# Patient Record
Sex: MALE | Race: WHITE | NOT HISPANIC OR LATINO | Employment: OTHER | URBAN - METROPOLITAN AREA
[De-identification: names, ages, dates, MRNs, and addresses within clinical notes are randomized per-mention and may not be internally consistent; named-entity substitution may affect disease eponyms.]

---

## 2020-09-16 ENCOUNTER — OFFICE VISIT (OUTPATIENT)
Dept: NEUROLOGY | Facility: CLINIC | Age: 55
End: 2020-09-16
Payer: MEDICAID

## 2020-09-16 VITALS
DIASTOLIC BLOOD PRESSURE: 84 MMHG | SYSTOLIC BLOOD PRESSURE: 148 MMHG | RESPIRATION RATE: 16 BRPM | TEMPERATURE: 97 F | HEIGHT: 68 IN | WEIGHT: 212.94 LBS | BODY MASS INDEX: 32.27 KG/M2 | HEART RATE: 78 BPM

## 2020-09-16 DIAGNOSIS — G89.29 CHRONIC NECK PAIN: ICD-10-CM

## 2020-09-16 DIAGNOSIS — M54.2 CHRONIC NECK PAIN: ICD-10-CM

## 2020-09-16 DIAGNOSIS — M75.42 IMPINGEMENT SYNDROME OF BOTH SHOULDERS: ICD-10-CM

## 2020-09-16 DIAGNOSIS — M75.41 IMPINGEMENT SYNDROME OF BOTH SHOULDERS: ICD-10-CM

## 2020-09-16 PROCEDURE — 99204 OFFICE O/P NEW MOD 45 MIN: CPT | Mod: PBBFAC | Performed by: PHYSICIAN ASSISTANT

## 2020-09-16 PROCEDURE — 99999 PR PBB SHADOW E&M-NEW PATIENT-LVL IV: ICD-10-PCS | Mod: PBBFAC,,, | Performed by: PHYSICIAN ASSISTANT

## 2020-09-16 PROCEDURE — 99204 PR OFFICE/OUTPT VISIT, NEW, LEVL IV, 45-59 MIN: ICD-10-PCS | Mod: S$PBB,,, | Performed by: PHYSICIAN ASSISTANT

## 2020-09-16 PROCEDURE — 99999 PR PBB SHADOW E&M-NEW PATIENT-LVL IV: CPT | Mod: PBBFAC,,, | Performed by: PHYSICIAN ASSISTANT

## 2020-09-16 PROCEDURE — 99204 OFFICE O/P NEW MOD 45 MIN: CPT | Mod: S$PBB,,, | Performed by: PHYSICIAN ASSISTANT

## 2020-09-16 RX ORDER — POTASSIUM CHLORIDE 20 MEQ/1
20 TABLET, EXTENDED RELEASE ORAL DAILY
COMMUNITY
Start: 2020-09-01 | End: 2023-06-26

## 2020-09-16 RX ORDER — COLCHICINE 0.6 MG/1
1 CAPSULE ORAL DAILY
COMMUNITY
Start: 2020-09-01 | End: 2023-06-26

## 2020-09-16 RX ORDER — BISOPROLOL FUMARATE 5 MG/1
5 TABLET, FILM COATED ORAL DAILY
COMMUNITY
Start: 2020-09-01

## 2020-09-16 RX ORDER — TIZANIDINE 4 MG/1
4 TABLET ORAL 3 TIMES DAILY PRN
COMMUNITY
Start: 2020-08-21 | End: 2021-02-19

## 2020-09-16 RX ORDER — INDOMETHACIN 50 MG/1
50 CAPSULE ORAL 2 TIMES DAILY PRN
COMMUNITY
Start: 2020-09-01

## 2020-09-16 RX ORDER — NAPROXEN 500 MG/1
500 TABLET ORAL 2 TIMES DAILY PRN
COMMUNITY
Start: 2020-08-03 | End: 2021-02-19

## 2020-09-16 RX ORDER — MELOXICAM 7.5 MG/1
TABLET ORAL
COMMUNITY
Start: 2020-08-13 | End: 2021-02-19

## 2020-09-16 RX ORDER — GABAPENTIN 300 MG/1
CAPSULE ORAL
COMMUNITY
Start: 2020-09-02 | End: 2021-02-19

## 2020-09-16 RX ORDER — AMLODIPINE AND BENAZEPRIL HYDROCHLORIDE 5; 40 MG/1; MG/1
1 CAPSULE ORAL DAILY
COMMUNITY
Start: 2020-09-01 | End: 2023-06-26

## 2020-09-16 RX ORDER — METFORMIN HYDROCHLORIDE 500 MG/1
500 TABLET ORAL 2 TIMES DAILY
COMMUNITY
Start: 2020-09-01

## 2020-09-16 RX ORDER — SIMVASTATIN 40 MG/1
40 TABLET, FILM COATED ORAL DAILY
COMMUNITY
Start: 2020-09-01

## 2020-09-16 RX ORDER — SITAGLIPTIN 50 MG/1
50 TABLET, FILM COATED ORAL DAILY
COMMUNITY
Start: 2020-09-01

## 2020-09-16 NOTE — PROGRESS NOTES
Subjective:       Patient ID: Tal Salmeron is a 55 y.o. male.    Chief Complaint: Pain (bilateral shoulders/ into arms/ left side worse than right/ PT started this morning )    HPI  Tal Salmeron is a 55 y.o. male is here for initial visit. He is referred for bilateral arm pain and neck pain. Pain started a few months ago. No specific injury. He complains of crepitus in the neck with movement. He has pain with movement as well. Pain is in right and left neck and into traps. Both shoulders are painful, left worse than right. He has no numbness or tingling in the arms    He had cervical and shoulder x rays done a last month. Degenerative changes in the lower c spine were seen. Shoulder x rays not available.    He has no weakness. No bowel or bladder incontinence. He has some long standing ED issues.    First PT session this AM. More pain since. He plans to continue if approved.    Past Medical History:   Diagnosis Date    High blood pressure        History reviewed. No pertinent surgical history.    History reviewed. No pertinent family history.    Social History     Socioeconomic History    Marital status:      Spouse name: Not on file    Number of children: Not on file    Years of education: Not on file    Highest education level: Not on file   Occupational History    Not on file   Social Needs    Financial resource strain: Not on file    Food insecurity     Worry: Not on file     Inability: Not on file    Transportation needs     Medical: Not on file     Non-medical: Not on file   Tobacco Use    Smoking status: Current Every Day Smoker     Types: Pipe   Substance and Sexual Activity    Alcohol use: Not on file    Drug use: Not on file    Sexual activity: Not on file   Lifestyle    Physical activity     Days per week: Not on file     Minutes per session: Not on file    Stress: Not on file   Relationships    Social connections     Talks on phone: Not on file     Gets together: Not on file      Attends [employer] service: Not on file     Active member of club or organization: Not on file     Attends meetings of clubs or organizations: Not on file     Relationship status: Not on file   Other Topics Concern    Not on file   Social History Narrative    Not on file       Current Outpatient Medications   Medication Sig Dispense Refill    amLODIPine-benazepriL (LOTREL) 5-40 mg per capsule Take 1 capsule by mouth once daily.      bisoprolol (ZEBETA) 5 MG tablet Take 5 mg by mouth once daily.      colchicine (MITIGARE) 0.6 mg Cap Take 1 capsule by mouth once daily.      gabapentin (NEURONTIN) 300 MG capsule TAKE ONE CAPSULE BY MOUTH AT BEDTIME for nerve pain      indomethacin (INDOCIN) 50 MG capsule Take 50 mg by mouth 2 (two) times daily.      JANUVIA 50 mg Tab Take 50 mg by mouth once daily.      metFORMIN (GLUCOPHAGE) 500 MG tablet Take 500 mg by mouth 2 (two) times daily.      potassium chloride SA (K-DUR,KLOR-CON) 20 MEQ tablet Take 20 mEq by mouth once daily.      simvastatin (ZOCOR) 40 MG tablet Take 40 mg by mouth once daily.      tiZANidine (ZANAFLEX) 4 MG tablet Take 4 mg by mouth 3 (three) times daily as needed.      meloxicam (MOBIC) 7.5 MG tablet TAKE ONE TABLET BY MOUTH once a day WITH FOOD      naproxen (NAPROSYN) 500 MG tablet Take 500 mg by mouth 2 (two) times daily as needed. for pain       No current facility-administered medications for this visit.        Review of patient's allergies indicates:  No Known Allergies      Review of Systems   Constitutional: Positive for activity change (covid, layed off). Negative for appetite change and fever.   HENT: Negative for sore throat.    Eyes: Negative for visual disturbance.   Respiratory: Negative for cough and shortness of breath.    Cardiovascular: Negative for chest pain.   Gastrointestinal: Negative for nausea and vomiting.   Endocrine: Negative for cold intolerance and heat intolerance.   Genitourinary: Negative for difficulty  urinating.   Musculoskeletal: Positive for arthralgias (bilat shoulder pain), neck pain and neck stiffness. Negative for back pain.   Skin: Negative for rash.   Allergic/Immunologic: Negative for food allergies.   Neurological: Negative for dizziness, tremors, seizures, speech difficulty, weakness, numbness and headaches.   Hematological: Does not bruise/bleed easily.   Psychiatric/Behavioral: Negative for agitation, decreased concentration and sleep disturbance.       Objective:      Neurologic Exam     Mental Status   Oriented to person, place, and time.     Cranial Nerves     CN III, IV, VI   Pupils are equal, round, and reactive to light.    Gait, Coordination, and Reflexes     Gait  Gait: normal    Reflexes   Right brachioradialis: 2+  Left brachioradialis: 2+  Right biceps: 2+  Left biceps: 2+  Right triceps: 2+  Left triceps: 2+  Right patellar: 2+  Left patellar: 2+  Right achilles: 2+  Left achilles: 2+    Physical Exam  Vitals signs and nursing note reviewed.   Constitutional:       General: He is not in acute distress.     Appearance: Normal appearance. He is well-developed. He is not diaphoretic.   HENT:      Head: Normocephalic and atraumatic.      Right Ear: External ear normal.      Left Ear: External ear normal.      Nose: Nose normal.   Eyes:      General: No scleral icterus.        Right eye: No discharge.         Left eye: No discharge.      Extraocular Movements: Extraocular movements intact.      Conjunctiva/sclera: Conjunctivae normal.      Pupils: Pupils are equal, round, and reactive to light.   Neck:      Musculoskeletal: Neck supple. Muscular tenderness (cervical facet tenderness right and left throughout) present.      Comments: Cervical kyphosis  Limited rotation L and R  Limited extension  Cardiovascular:      Rate and Rhythm: Normal rate and regular rhythm.      Heart sounds: Normal heart sounds.   Pulmonary:      Effort: Pulmonary effort is normal.      Breath sounds: Normal breath  sounds.   Abdominal:      General: Bowel sounds are normal.      Palpations: Abdomen is soft.   Musculoskeletal:         General: No tenderness.      Comments: Limited abd both shoulers  Less than 90 degrees on Left  Pain with abd, pain w IR bilat   Skin:     General: Skin is warm and dry.   Neurological:      Mental Status: He is alert and oriented to person, place, and time.      Cranial Nerves: Cranial nerves are intact. No cranial nerve deficit.      Sensory: Sensation is intact.      Motor: Motor function is intact. No abnormal muscle tone.      Coordination: Coordination is intact. Coordination normal.      Gait: Gait is intact.      Deep Tendon Reflexes: Babinski sign absent on the right side. Babinski sign absent on the left side.      Reflex Scores:       Tricep reflexes are 2+ on the right side and 2+ on the left side.       Bicep reflexes are 2+ on the right side and 2+ on the left side.       Brachioradialis reflexes are 2+ on the right side and 2+ on the left side.       Patellar reflexes are 2+ on the right side and 2+ on the left side.       Achilles reflexes are 2+ on the right side and 2+ on the left side.     Comments: Negative Spurling's R/L  Rodas's negative.   Psychiatric:         Mood and Affect: Mood normal.         Behavior: Behavior normal.         Assessment:       1. Chronic neck pain    2. Impingement syndrome of both shoulders        Plan:   Chronic neck pain  Demonstrated exercises. Encouraged him to perform daily stretching exercises and ROM exercises.  Very limited ROM.  Continue PT as well.  No suspicion of cord involvement.    Impingement syndrome of both shoulders  No radicular symptoms suspected.  More shoulder pain than neck pain.  I will refer to ortho for further eval and treatment.      Discussed risks and benefits of potential treatment options at length as well as potential side effects of medication changes. Medications were changed. Please refer to medication  reconciliation report for details. Medication compliance discussed. Instructed to call clinic if concerned or to ED if emergency.    ROXANA Tabor

## 2020-09-22 DIAGNOSIS — M25.512 BILATERAL SHOULDER PAIN, UNSPECIFIED CHRONICITY: Primary | ICD-10-CM

## 2020-09-22 DIAGNOSIS — M25.511 BILATERAL SHOULDER PAIN, UNSPECIFIED CHRONICITY: Primary | ICD-10-CM

## 2020-09-23 ENCOUNTER — OFFICE VISIT (OUTPATIENT)
Dept: ORTHOPEDICS | Facility: CLINIC | Age: 55
End: 2020-09-23
Payer: MEDICAID

## 2020-09-23 ENCOUNTER — HOSPITAL ENCOUNTER (OUTPATIENT)
Dept: RADIOLOGY | Facility: HOSPITAL | Age: 55
Discharge: HOME | End: 2020-09-23
Attending: PHYSICIAN ASSISTANT
Payer: MEDICAID

## 2020-09-23 VITALS
TEMPERATURE: 98 F | RESPIRATION RATE: 18 BRPM | DIASTOLIC BLOOD PRESSURE: 88 MMHG | HEIGHT: 68 IN | BODY MASS INDEX: 32.23 KG/M2 | SYSTOLIC BLOOD PRESSURE: 146 MMHG | WEIGHT: 212.63 LBS | HEART RATE: 80 BPM

## 2020-09-23 DIAGNOSIS — M25.512 BILATERAL SHOULDER PAIN, UNSPECIFIED CHRONICITY: ICD-10-CM

## 2020-09-23 DIAGNOSIS — G89.29 CHRONIC PAIN OF BOTH SHOULDERS: Primary | ICD-10-CM

## 2020-09-23 DIAGNOSIS — M75.41 IMPINGEMENT SYNDROME OF BOTH SHOULDERS: ICD-10-CM

## 2020-09-23 DIAGNOSIS — M25.511 BILATERAL SHOULDER PAIN, UNSPECIFIED CHRONICITY: ICD-10-CM

## 2020-09-23 DIAGNOSIS — M25.512 CHRONIC PAIN OF BOTH SHOULDERS: Primary | ICD-10-CM

## 2020-09-23 DIAGNOSIS — M25.511 CHRONIC PAIN OF BOTH SHOULDERS: Primary | ICD-10-CM

## 2020-09-23 DIAGNOSIS — M75.42 IMPINGEMENT SYNDROME OF BOTH SHOULDERS: ICD-10-CM

## 2020-09-23 PROCEDURE — 73030 X-RAY EXAM OF SHOULDER: CPT | Mod: TC,50

## 2020-09-23 PROCEDURE — S0020 INJECTION, BUPIVICAINE HYDRO: HCPCS | Mod: PBBFAC | Performed by: PHYSICIAN ASSISTANT

## 2020-09-23 PROCEDURE — 73030 XR SHOULDER COMPLETE 2 OR MORE VIEWS BILATERAL: ICD-10-PCS | Mod: 26,50,, | Performed by: RADIOLOGY

## 2020-09-23 PROCEDURE — 73030 X-RAY EXAM OF SHOULDER: CPT | Mod: 26,50,, | Performed by: RADIOLOGY

## 2020-09-23 PROCEDURE — 99999 PR PBB SHADOW E&M-EST. PATIENT-LVL V: ICD-10-PCS | Mod: PBBFAC,,, | Performed by: PHYSICIAN ASSISTANT

## 2020-09-23 PROCEDURE — 99215 OFFICE O/P EST HI 40 MIN: CPT | Mod: PBBFAC,25 | Performed by: PHYSICIAN ASSISTANT

## 2020-09-23 PROCEDURE — 99203 PR OFFICE/OUTPT VISIT, NEW, LEVL III, 30-44 MIN: ICD-10-PCS | Mod: S$PBB,,, | Performed by: PHYSICIAN ASSISTANT

## 2020-09-23 PROCEDURE — 99203 OFFICE O/P NEW LOW 30 MIN: CPT | Mod: S$PBB,,, | Performed by: PHYSICIAN ASSISTANT

## 2020-09-23 PROCEDURE — 99999 PR PBB SHADOW E&M-EST. PATIENT-LVL V: CPT | Mod: PBBFAC,,, | Performed by: PHYSICIAN ASSISTANT

## 2020-09-23 RX ORDER — TRIAMCINOLONE ACETONIDE 40 MG/ML
40 INJECTION, SUSPENSION INTRA-ARTICULAR; INTRAMUSCULAR ONCE
Status: COMPLETED | OUTPATIENT
Start: 2020-09-23 | End: 2020-09-23

## 2020-09-23 RX ORDER — BUPIVACAINE HYDROCHLORIDE 2.5 MG/ML
3 INJECTION, SOLUTION INFILTRATION; PERINEURAL ONCE
Status: COMPLETED | OUTPATIENT
Start: 2020-09-23 | End: 2020-09-23

## 2020-09-23 RX ADMIN — BUPIVACAINE HYDROCHLORIDE 7.5 MG: 2.5 INJECTION, SOLUTION INFILTRATION; PERINEURAL at 03:09

## 2020-09-23 RX ADMIN — TRIAMCINOLONE ACETONIDE 40 MG: 40 INJECTION, SUSPENSION INTRA-ARTICULAR; INTRAMUSCULAR at 03:09

## 2020-09-23 NOTE — PROGRESS NOTES
Subjective:      Patient ID: Tal Salmeron is a 55 y.o. male.    Chief Complaint: Pain of the Right Shoulder and Pain of the Left Shoulder    Review of patient's allergies indicates:  No Known Allergies   54 yo M presents to clinic with c/o bilateral shoulder pain x years.  Left worse than right.  Denies any injury or trauma.  C/o pain to posterior shoulders.  Also has neck pain, states that he was diagnosed with bulging disc.  Has pain that radiates down left arm to fingertips.  No numbness or tingling.  Seeing neurology for this.  Has had decreased ROM of shoulders.  Pain is sharp and worse at night and when he lays on shoulder.    He started physical therapy for neck and shoulders yesterday at [facility].  Taking gabapentin.  Has tried mobic, diclofenac, indomethacin in the past with little relief.      Review of Systems   Constitution: Negative for chills, diaphoresis and fever.   HENT: Negative for congestion, ear discharge and ear pain.    Eyes: Negative for blurred vision, discharge, double vision and pain.   Cardiovascular: Negative for chest pain, claudication and cyanosis.   Respiratory: Negative for cough, hemoptysis and shortness of breath.    Endocrine: Negative for cold intolerance and heat intolerance.   Skin: Negative for color change, dry skin, itching and rash.   Musculoskeletal: Positive for joint pain and neck pain. Negative for arthritis, back pain, falls, gout, joint swelling and muscle weakness.   Gastrointestinal: Negative for abdominal pain and change in bowel habit.   Neurological: Negative for brief paralysis, disturbances in coordination and dizziness.   Psychiatric/Behavioral: Negative for altered mental status and depression.         Objective:          General    Constitutional: He is oriented to person, place, and time. He appears well-developed and well-nourished. No distress.   HENT:   Head: Atraumatic.   Eyes: EOM are normal. Right eye exhibits no discharge. Left eye  exhibits no discharge.   Cardiovascular: Normal rate.    Pulmonary/Chest: Effort normal. No respiratory distress.   Abdominal: Soft.   Neurological: He is alert and oriented to person, place, and time.   Psychiatric: He has a normal mood and affect. His behavior is normal.         Back (L-Spine & T-Spine) / Neck (C-Spine) Exam     Tenderness   The patient is tender to palpation of the right trapezial and left trapezial.   Right Shoulder Exam     Inspection/Observation   Swelling: absent  Bruising: absent  Deformity: absent  Scapular Winging: absent    Range of Motion   Active abduction: 140   Passive abduction: 140   External Rotation 0 degrees: 50   Internal rotation 0 degrees: Sacrum     Tests & Signs   Nova test: positive  Impingement: positive  Lift Off Sign: positive  Belly Press: positive  Speed's Test: positive    Other   Sensation: normal    Comments:  Tenderness to posterior shoulder    Left Shoulder Exam     Inspection/Observation   Swelling: absent  Bruising: absent  Deformity: absent  Scapular Winging: absent    Range of Motion   Active abduction: 120   Passive abduction: 130   External Rotation 0 degrees: 40   Internal rotation 0 degrees: Sacrum     Tests & Signs   Nova test: positive  Impingement: positive  Lift Off Sign: positive  Belly Press: positive  Speed's Test: positive    Other   Sensation: normal     Comments:  Tenderness to posterior shoulder      Muscle Strength   Right Upper Extremity   Shoulder Abduction: 5/5   Shoulder Internal Rotation: 5/5   Shoulder External Rotation: 4/5   Supraspinatus: 4/5   Subscapularis: 5/5   Biceps: 5/5   Left Upper Extremity  Shoulder Abduction: 5/5   Shoulder Internal Rotation: 5/5   Shoulder External Rotation: 4/5   Supraspinatus: 4/5   Subscapularis: 5/5   Biceps: 5/5     Vascular Exam     Right Pulses      Radial:                    2+      Left Pulses      Radial:                    2+      Capillary Refill  Right Hand: normal capillary refill  Left  Hand: normal capillary refill              Assessment:         Xray Bilateral Shoulders 9/23/20:  There is no evidence of fracture, dislocation or other acute osseous abnormality. No focal soft tissue abnormality.    Encounter Diagnoses   Name Primary?    Impingement syndrome of both shoulders     Chronic pain of both shoulders Yes    Chronic pain of both shoulders  -     bupivacaine 0.25% (2.5 mg/ml) injection 7.5 mg  -     bupivacaine 0.25% (2.5 mg/ml) injection 7.5 mg  -     triamcinolone acetonide injection 40 mg  -     triamcinolone acetonide injection 40 mg    Impingement syndrome of both shoulders  -     Ambulatory referral/consult to Orthopedics  -     bupivacaine 0.25% (2.5 mg/ml) injection 7.5 mg  -     bupivacaine 0.25% (2.5 mg/ml) injection 7.5 mg  -     triamcinolone acetonide injection 40 mg  -     triamcinolone acetonide injection 40 mg               Plan:         We have discussed a variety of treatment options including medications, injections, physical therapy and other alternative treatments. I also explained the indications, risks and benefits of surgery. Patient chooses to proceed with CSI and physical therapy at this time.  We discussed that symptoms could be due to neck pain. Patient chooses to try therapeutic/diagnostic bilateral shoulder injections today. He will also continue physical therapy for neck and shoulders.  .injec    I made the decision to obtain old records of the patient including previous notes and imaging. New imaging was ordered today of the extremity or extremities evaluated. I independently reviewed and interpreted the radiographs and/or MRIs today as well as prior imaging.      1. Injection Procedure  A time out was performed, including verification of patient ID, procedure, site and side, availability of information and equipment, review of safety issues, and agreement with consent, the procedure site was marked.    After time out was performed, the patient was prepped  aseptically with chloraprep swabstick. A diagnostic and therapeutic injection of 1:3cc Kenalog/Marcaine was given under sterile technique using a 22g x 1.5 needle from the Posterior  aspect of the bilateral Subacromial in the sitting position.      Tal Salmeron had no adverse reactions to the medication. Pain decreased. He was instructed to apply ice to the joint for 20 minutes and avoid strenuous activities for 24-36 hours following the injection. He was warned of possible blood sugar and/or blood pressure changes during that time. Following that time, he can resume regular activities.    He was reminded to call the clinic immediately for any adverse side effects as explained in clinic today.    2. Ice compress to the affected area 2-3x a day for 15-20 minutes as needed for pain management.  3.Continue physical therapy for periscapular/rotator cuff strengthening. Patient will contact clinic if he needs another referral.  4. RTC in 6 weeks for follow-up, sooner if needed.    Patient voices understanding of and agreement with treatment plan. All of the patient's questions were answered and the patient will contact us if he has any questions or concerns in the interim.

## 2020-09-23 NOTE — LETTER
September 25, 2020      ROXANA Jacinto  [address]  [facility] [address]           [facility]  [address] [facility] [address]  Phone: [phone]          Patient: Tal Salmeron   MR Number: [id number]   YOB: 1965   Date of Visit: 9/23/2020       Dear Christelle Mathis:    Thank you for referring Tal Salmeron to me for evaluation. Attached you will find relevant portions of my assessment and plan of care.    If you have questions, please do not hesitate to call me. I look forward to following Tal Salmeron along with you.    Sincerely,    Doris Amaya PA-C    Enclosure  CC:  No Recipients    If you would like to receive this communication electronically, please contact [email or web address] or [phone] to request more information on [facility] access.    For providers and/or their staff who would like to refer a patient to [facility], please contact us through our one-stop-shop provider referral line, [facility], at [phone].    If you feel you have received this communication in error or would no longer like to receive these types of communications, please e-mail [email or web address]

## 2020-12-16 ENCOUNTER — OFFICE VISIT (OUTPATIENT)
Dept: NEUROLOGY | Facility: CLINIC | Age: 55
End: 2020-12-16
Payer: MEDICAID

## 2020-12-16 ENCOUNTER — OFFICE VISIT (OUTPATIENT)
Dept: ORTHOPEDICS | Facility: CLINIC | Age: 55
End: 2020-12-16
Payer: MEDICAID

## 2020-12-16 VITALS
HEIGHT: 68 IN | WEIGHT: 218.25 LBS | BODY MASS INDEX: 33.08 KG/M2 | SYSTOLIC BLOOD PRESSURE: 152 MMHG | HEART RATE: 72 BPM | DIASTOLIC BLOOD PRESSURE: 104 MMHG | RESPIRATION RATE: 16 BRPM | TEMPERATURE: 97 F

## 2020-12-16 VITALS
SYSTOLIC BLOOD PRESSURE: 154 MMHG | HEART RATE: 73 BPM | RESPIRATION RATE: 16 BRPM | TEMPERATURE: 97 F | HEIGHT: 68 IN | WEIGHT: 218.25 LBS | BODY MASS INDEX: 33.08 KG/M2 | DIASTOLIC BLOOD PRESSURE: 100 MMHG

## 2020-12-16 DIAGNOSIS — M75.42 IMPINGEMENT SYNDROME OF BOTH SHOULDERS: Primary | ICD-10-CM

## 2020-12-16 DIAGNOSIS — G89.29 CHRONIC RIGHT SHOULDER PAIN: ICD-10-CM

## 2020-12-16 DIAGNOSIS — M75.41 IMPINGEMENT SYNDROME OF BOTH SHOULDERS: Primary | ICD-10-CM

## 2020-12-16 DIAGNOSIS — M25.511 CHRONIC RIGHT SHOULDER PAIN: ICD-10-CM

## 2020-12-16 DIAGNOSIS — M54.2 CHRONIC NECK PAIN: ICD-10-CM

## 2020-12-16 DIAGNOSIS — G89.29 CHRONIC NECK PAIN: ICD-10-CM

## 2020-12-16 PROCEDURE — 99213 OFFICE O/P EST LOW 20 MIN: CPT | Mod: S$PBB,,, | Performed by: PHYSICIAN ASSISTANT

## 2020-12-16 PROCEDURE — 99999 PR PBB SHADOW E&M-EST. PATIENT-LVL IV: ICD-10-PCS | Mod: PBBFAC,,, | Performed by: PHYSICIAN ASSISTANT

## 2020-12-16 PROCEDURE — 99214 OFFICE O/P EST MOD 30 MIN: CPT | Mod: S$PBB,,, | Performed by: PHYSICIAN ASSISTANT

## 2020-12-16 PROCEDURE — 99213 OFFICE O/P EST LOW 20 MIN: CPT | Mod: PBBFAC | Performed by: PHYSICIAN ASSISTANT

## 2020-12-16 PROCEDURE — 99214 PR OFFICE/OUTPT VISIT, EST, LEVL IV, 30-39 MIN: ICD-10-PCS | Mod: S$PBB,,, | Performed by: PHYSICIAN ASSISTANT

## 2020-12-16 PROCEDURE — 99999 PR PBB SHADOW E&M-EST. PATIENT-LVL III: CPT | Mod: PBBFAC,,, | Performed by: PHYSICIAN ASSISTANT

## 2020-12-16 PROCEDURE — 99213 PR OFFICE/OUTPT VISIT, EST, LEVL III, 20-29 MIN: ICD-10-PCS | Mod: S$PBB,,, | Performed by: PHYSICIAN ASSISTANT

## 2020-12-16 PROCEDURE — 99214 OFFICE O/P EST MOD 30 MIN: CPT | Mod: PBBFAC | Performed by: PHYSICIAN ASSISTANT

## 2020-12-16 PROCEDURE — 99999 PR PBB SHADOW E&M-EST. PATIENT-LVL III: ICD-10-PCS | Mod: PBBFAC,,, | Performed by: PHYSICIAN ASSISTANT

## 2020-12-16 PROCEDURE — 99999 PR PBB SHADOW E&M-EST. PATIENT-LVL IV: CPT | Mod: PBBFAC,,, | Performed by: PHYSICIAN ASSISTANT

## 2020-12-16 NOTE — PROGRESS NOTES
Subjective:       Patient ID: Tal Salmeron is a 55 y.o. male.    Chief Complaint: Neurologic Problem (3 month follow up)    HPI  Tal Salmeron is a 55 y.o. male is here for initial visit. He is referred for bilateral arm pain and neck pain. Pain started a few months ago. No specific injury. He complains of crepitus in the neck with movement. He has pain with movement as well. Pain is in right and left neck and into traps. Both shoulders are painful, left worse than right. He has no numbness or tingling in the arms    He had cervical and shoulder x rays done a last month. Degenerative changes in the lower c spine were seen. Shoulder x rays not available.    He has no weakness. No bowel or bladder incontinence. He has some long standing ED issues.    He had 2 months of PT for shoulders and neck. Minimally helpful. He had injections in both shoulders per ortho, minimally helpful.  He has not had MRIs of shoulders. He will be following up with Ortho today.    He is having concerns about work. He does not feel he will be able to perform the work he is accustomed to. He is considering disability.        Past Medical History:   Diagnosis Date    High blood pressure        History reviewed. No pertinent surgical history.    History reviewed. No pertinent family history.    Social History     Socioeconomic History    Marital status:      Spouse name: Not on file    Number of children: Not on file    Years of education: Not on file    Highest education level: Not on file   Occupational History    Not on file   Social Needs    Financial resource strain: Not on file    Food insecurity     Worry: Not on file     Inability: Not on file    Transportation needs     Medical: Not on file     Non-medical: Not on file   Tobacco Use    Smoking status: Current Every Day Smoker     Types: Pipe    Smokeless tobacco: Never Used   Substance and Sexual Activity    Alcohol use: Not on file    Drug use: Not on file    Sexual  activity: Not on file   Lifestyle    Physical activity     Days per week: Not on file     Minutes per session: Not on file    Stress: Not on file   Relationships    Social connections     Talks on phone: Not on file     Gets together: Not on file     Attends Faith service: Not on file     Active member of club or organization: Not on file     Attends meetings of clubs or organizations: Not on file     Relationship status: Not on file   Other Topics Concern    Not on file   Social History Narrative    Not on file       Current Outpatient Medications   Medication Sig Dispense Refill    amLODIPine-benazepriL (LOTREL) 5-40 mg per capsule Take 1 capsule by mouth once daily.      bisoprolol (ZEBETA) 5 MG tablet Take 5 mg by mouth once daily.      colchicine (MITIGARE) 0.6 mg Cap Take 1 capsule by mouth once daily.      gabapentin (NEURONTIN) 300 MG capsule TAKE ONE CAPSULE BY MOUTH AT BEDTIME for nerve pain      indomethacin (INDOCIN) 50 MG capsule Take 50 mg by mouth 2 (two) times daily.      JANUVIA 50 mg Tab Take 50 mg by mouth once daily.      meloxicam (MOBIC) 7.5 MG tablet TAKE ONE TABLET BY MOUTH once a day WITH FOOD      metFORMIN (GLUCOPHAGE) 500 MG tablet Take 500 mg by mouth 2 (two) times daily.      naproxen (NAPROSYN) 500 MG tablet Take 500 mg by mouth 2 (two) times daily as needed. for pain      potassium chloride SA (K-DUR,KLOR-CON) 20 MEQ tablet Take 20 mEq by mouth once daily.      simvastatin (ZOCOR) 40 MG tablet Take 40 mg by mouth once daily.      tiZANidine (ZANAFLEX) 4 MG tablet Take 4 mg by mouth 3 (three) times daily as needed.       No current facility-administered medications for this visit.        Review of patient's allergies indicates:  No Known Allergies      Review of Systems   Constitutional: Positive for activity change (covid, layed off). Negative for appetite change and fever.   HENT: Negative for sore throat.    Eyes: Negative for visual disturbance.   Respiratory:  Negative for cough and shortness of breath.    Cardiovascular: Negative for chest pain.   Gastrointestinal: Negative for nausea and vomiting.   Endocrine: Negative for cold intolerance and heat intolerance.   Genitourinary: Negative for difficulty urinating.   Musculoskeletal: Positive for arthralgias (bilat shoulder pain), neck pain and neck stiffness. Negative for back pain.   Skin: Negative for rash.   Allergic/Immunologic: Negative for food allergies.   Neurological: Negative for dizziness, tremors, seizures, speech difficulty, weakness, numbness and headaches.   Hematological: Does not bruise/bleed easily.   Psychiatric/Behavioral: Negative for agitation, decreased concentration and sleep disturbance.       Objective:      Neurologic Exam     Mental Status   Oriented to person, place, and time.     Cranial Nerves     CN III, IV, VI   Pupils are equal, round, and reactive to light.    Gait, Coordination, and Reflexes     Gait  Gait: normal    Reflexes   Right brachioradialis: 2+  Left brachioradialis: 2+  Right biceps: 2+  Left biceps: 2+  Right triceps: 2+  Left triceps: 2+  Right patellar: 2+  Left patellar: 2+  Right achilles: 2+  Left achilles: 2+    Physical Exam  Vitals signs and nursing note reviewed.   Constitutional:       General: He is not in acute distress.     Appearance: Normal appearance. He is well-developed. He is not diaphoretic.   HENT:      Head: Normocephalic and atraumatic.      Right Ear: External ear normal.      Left Ear: External ear normal.      Nose: Nose normal.   Eyes:      General: No scleral icterus.        Right eye: No discharge.         Left eye: No discharge.      Extraocular Movements: Extraocular movements intact.      Conjunctiva/sclera: Conjunctivae normal.      Pupils: Pupils are equal, round, and reactive to light.   Neck:      Musculoskeletal: Neck supple. Muscular tenderness (cervical facet tenderness right and left throughout) present.      Comments: Cervical  kyphosis  Limited rotation L and R  Limited extension  Cardiovascular:      Rate and Rhythm: Normal rate and regular rhythm.      Heart sounds: Normal heart sounds.   Pulmonary:      Effort: Pulmonary effort is normal.      Breath sounds: Normal breath sounds.   Abdominal:      General: Bowel sounds are normal.      Palpations: Abdomen is soft.   Musculoskeletal:         General: No tenderness.      Comments: Limited abd both shoulers  Less than 90 degrees on Left  Pain with abd, pain w IR bilat   Skin:     General: Skin is warm and dry.   Neurological:      Mental Status: He is alert and oriented to person, place, and time.      Cranial Nerves: Cranial nerves are intact. No cranial nerve deficit.      Sensory: Sensation is intact.      Motor: Motor function is intact. No abnormal muscle tone.      Coordination: Coordination is intact. Coordination normal.      Gait: Gait is intact.      Deep Tendon Reflexes: Babinski sign absent on the right side. Babinski sign absent on the left side.      Reflex Scores:       Tricep reflexes are 2+ on the right side and 2+ on the left side.       Bicep reflexes are 2+ on the right side and 2+ on the left side.       Brachioradialis reflexes are 2+ on the right side and 2+ on the left side.       Patellar reflexes are 2+ on the right side and 2+ on the left side.       Achilles reflexes are 2+ on the right side and 2+ on the left side.     Comments: Negative Spurling's R/L  Rodas's negative.   Psychiatric:         Mood and Affect: Mood normal.         Behavior: Behavior normal.         Assessment:       1. Impingement syndrome of both shoulders    2. Chronic neck pain        Plan:   Chronic neck pain  Demonstrated exercises. Encouraged him to perform daily stretching exercises and ROM exercises.  Continue home exercises  No suspicion of cord involvement.    Impingement syndrome of both shoulders  No radicular symptoms suspected.  More shoulder pain than neck pain.  Follow up  with ortho  Neuro prn      Discussed risks and benefits of potential treatment options at length as well as potential side effects of medication changes. Medications were not changed. Please refer to medication reconciliation report for details. Medication compliance discussed. Instructed to call clinic if concerned or to ED if emergency.    ROXANA Tabor

## 2020-12-17 NOTE — PROGRESS NOTES
Subjective:      Patient ID: Tal Salmeron is a 55 y.o. male.    Chief Complaint: Shoulder Pain (bilateral )    Review of patient's allergies indicates:  No Known Allergies   Interval:  Patient returns to clinic for follow up of bilateral shoulder pain, left worse than right.  He states that CSI at last visit provided some relief but only lasted a few days.  He states that he completed 2 months of PT (twice weekly) at [facility], noticed some improvement in ROM but not pain.  Requesting MRI of shoulder today.    OI 9/23/20:  56 yo M presents to clinic with c/o bilateral shoulder pain x years.  Left worse than right.  Denies any injury or trauma.  C/o pain to posterior shoulders.  Also has neck pain, states that he was diagnosed with bulging disc.  Has pain that radiates down left arm to fingertips.  No numbness or tingling.  Seeing neurology for this.  Has had decreased ROM of shoulders.  Pain is sharp and worse at night and when he lays on shoulder.    He started physical therapy for neck and shoulders yesterday at [facility].  Taking gabapentin.  Has tried mobic, diclofenac, indomethacin in the past with little relief.    Shoulder Pain   Pertinent negatives include no fever or itching. There is no history of gout.       Review of Systems   Constitution: Negative for chills, diaphoresis and fever.   HENT: Negative for congestion, ear discharge and ear pain.    Eyes: Negative for blurred vision, discharge, double vision and pain.   Cardiovascular: Negative for chest pain, claudication and cyanosis.   Respiratory: Negative for cough, hemoptysis and shortness of breath.    Endocrine: Negative for cold intolerance and heat intolerance.   Skin: Negative for color change, dry skin, itching and rash.   Musculoskeletal: Positive for joint pain and neck pain. Negative for arthritis, back pain, falls, gout, joint swelling and muscle weakness.   Gastrointestinal: Negative for abdominal pain and change in  bowel habit.   Neurological: Negative for brief paralysis, disturbances in coordination and dizziness.   Psychiatric/Behavioral: Negative for altered mental status and depression.         Objective:          General    Constitutional: He is oriented to person, place, and time. He appears well-developed and well-nourished. No distress.   HENT:   Head: Atraumatic.   Eyes: EOM are normal. Right eye exhibits no discharge. Left eye exhibits no discharge.   Cardiovascular: Normal rate.    Pulmonary/Chest: Effort normal. No respiratory distress.   Abdominal: Soft.   Neurological: He is alert and oriented to person, place, and time.   Psychiatric: He has a normal mood and affect. His behavior is normal.         Back (L-Spine & T-Spine) / Neck (C-Spine) Exam     Tenderness   The patient is tender to palpation of the right trapezial and left trapezial.   Right Shoulder Exam     Inspection/Observation   Swelling: absent  Bruising: absent  Deformity: absent  Scapular Winging: absent    Range of Motion   Active abduction: 140   Passive abduction: 140   External Rotation 0 degrees: 50   Internal rotation 0 degrees: Sacrum     Tests & Signs   Nova test: positive  Impingement: positive  Lift Off Sign: positive  Belly Press: positive  Speed's Test: positive    Other   Sensation: normal    Comments:  Tenderness to posterior shoulder    Left Shoulder Exam     Inspection/Observation   Swelling: absent  Bruising: absent  Deformity: absent  Scapular Winging: absent    Range of Motion   Active abduction: 120   Passive abduction: 130   External Rotation 0 degrees: 40   Internal rotation 0 degrees: Sacrum     Tests & Signs   Nova test: positive  Impingement: positive  Lift Off Sign: positive  Belly Press: positive  Speed's Test: positive    Other   Sensation: normal     Comments:  Tenderness to posterior shoulder      Muscle Strength   Right Upper Extremity   Shoulder Abduction: 5/5   Shoulder Internal Rotation: 5/5   Shoulder  External Rotation: 4/5   Supraspinatus: 4/5   Subscapularis: 5/5   Biceps: 5/5   Left Upper Extremity  Shoulder Abduction: 5/5   Shoulder Internal Rotation: 5/5   Shoulder External Rotation: 4/5   Supraspinatus: 4/5   Subscapularis: 5/5   Biceps: 5/5     Vascular Exam     Right Pulses      Radial:                    2+      Left Pulses      Radial:                    2+      Capillary Refill  Right Hand: normal capillary refill  Left Hand: normal capillary refill              Assessment:         Xray Bilateral Shoulders 9/23/20:  There is no evidence of fracture, dislocation or other acute osseous abnormality. No focal soft tissue abnormality.    Encounter Diagnosis   Name Primary?    Chronic right shoulder pain     Chronic right shoulder pain  -     MRI Shoulder Without Contrast Left; Future; Expected date: 12/16/2020               Plan:         I made the decision to obtain old records of the patient including previous notes and imaging. New imaging was ordered today of the extremity or extremities evaluated. I independently reviewed and interpreted the radiographs and/or MRIs today as well as prior imaging.    1. MRI left shoulder to assess for rotator cuff pathology.   2. Discontinue activity/exercise until results of MRI.  3. Ice compress to the affected area 2-3x a day for 15-20 minutes as needed for pain management.  4. RTC in 1 week for follow-up and MRI results, sooner if needed.    Patient voices understanding of and agreement with treatment plan. All of the patient's questions were answered and the patient will contact us if  he has any questions or concerns in the interim.

## 2020-12-23 ENCOUNTER — HOSPITAL ENCOUNTER (OUTPATIENT)
Dept: RADIOLOGY | Facility: HOSPITAL | Age: 55
Discharge: HOME | End: 2020-12-23
Attending: PHYSICIAN ASSISTANT
Payer: MEDICAID

## 2020-12-23 DIAGNOSIS — G89.29 CHRONIC RIGHT SHOULDER PAIN: ICD-10-CM

## 2020-12-23 DIAGNOSIS — M25.511 CHRONIC RIGHT SHOULDER PAIN: ICD-10-CM

## 2020-12-23 PROCEDURE — 73221 MRI JOINT UPR EXTREM W/O DYE: CPT | Mod: 26,LT,, | Performed by: RADIOLOGY

## 2020-12-23 PROCEDURE — 73221 MRI JOINT UPR EXTREM W/O DYE: CPT | Mod: TC,LT

## 2020-12-23 PROCEDURE — 73221 MRI SHOULDER WITHOUT CONTRAST LEFT: ICD-10-PCS | Mod: 26,LT,, | Performed by: RADIOLOGY

## 2021-01-06 ENCOUNTER — OFFICE VISIT (OUTPATIENT)
Dept: ORTHOPEDICS | Facility: CLINIC | Age: 56
End: 2021-01-06
Payer: MEDICAID

## 2021-01-06 VITALS
WEIGHT: 217.69 LBS | HEART RATE: 72 BPM | DIASTOLIC BLOOD PRESSURE: 90 MMHG | SYSTOLIC BLOOD PRESSURE: 132 MMHG | BODY MASS INDEX: 32.99 KG/M2 | RESPIRATION RATE: 18 BRPM | TEMPERATURE: 98 F | HEIGHT: 68 IN

## 2021-01-06 DIAGNOSIS — M25.511 CHRONIC PAIN OF BOTH SHOULDERS: ICD-10-CM

## 2021-01-06 DIAGNOSIS — G89.29 CHRONIC PAIN OF BOTH SHOULDERS: ICD-10-CM

## 2021-01-06 DIAGNOSIS — M25.512 CHRONIC PAIN OF BOTH SHOULDERS: ICD-10-CM

## 2021-01-06 DIAGNOSIS — M54.2 NECK PAIN: Primary | ICD-10-CM

## 2021-01-06 PROCEDURE — 99213 OFFICE O/P EST LOW 20 MIN: CPT | Mod: PBBFAC | Performed by: PHYSICIAN ASSISTANT

## 2021-01-06 PROCEDURE — 99213 PR OFFICE/OUTPT VISIT, EST, LEVL III, 20-29 MIN: ICD-10-PCS | Mod: S$PBB,,, | Performed by: PHYSICIAN ASSISTANT

## 2021-01-06 PROCEDURE — 99999 PR PBB SHADOW E&M-EST. PATIENT-LVL III: CPT | Mod: PBBFAC,,, | Performed by: PHYSICIAN ASSISTANT

## 2021-01-06 PROCEDURE — 99999 PR PBB SHADOW E&M-EST. PATIENT-LVL III: ICD-10-PCS | Mod: PBBFAC,,, | Performed by: PHYSICIAN ASSISTANT

## 2021-01-06 PROCEDURE — 99213 OFFICE O/P EST LOW 20 MIN: CPT | Mod: S$PBB,,, | Performed by: PHYSICIAN ASSISTANT

## 2021-01-13 ENCOUNTER — OFFICE VISIT (OUTPATIENT)
Dept: NEUROLOGY | Facility: CLINIC | Age: 56
End: 2021-01-13
Payer: MEDICAID

## 2021-01-13 VITALS
HEART RATE: 84 BPM | HEIGHT: 68 IN | WEIGHT: 220.44 LBS | RESPIRATION RATE: 16 BRPM | TEMPERATURE: 98 F | SYSTOLIC BLOOD PRESSURE: 152 MMHG | DIASTOLIC BLOOD PRESSURE: 94 MMHG | BODY MASS INDEX: 33.41 KG/M2

## 2021-01-13 DIAGNOSIS — Z01.818 ENCOUNTER FOR OTHER PREPROCEDURAL EXAMINATION: Primary | ICD-10-CM

## 2021-01-13 DIAGNOSIS — M50.30 DEGENERATIVE DISC DISEASE, CERVICAL: ICD-10-CM

## 2021-01-13 PROCEDURE — 99214 OFFICE O/P EST MOD 30 MIN: CPT | Mod: PBBFAC | Performed by: PHYSICIAN ASSISTANT

## 2021-01-13 PROCEDURE — 99999 PR PBB SHADOW E&M-EST. PATIENT-LVL IV: ICD-10-PCS | Mod: PBBFAC,,, | Performed by: PHYSICIAN ASSISTANT

## 2021-01-13 PROCEDURE — 99999 PR PBB SHADOW E&M-EST. PATIENT-LVL IV: CPT | Mod: PBBFAC,,, | Performed by: PHYSICIAN ASSISTANT

## 2021-01-13 PROCEDURE — 99214 PR OFFICE/OUTPT VISIT, EST, LEVL IV, 30-39 MIN: ICD-10-PCS | Mod: S$PBB,,, | Performed by: PHYSICIAN ASSISTANT

## 2021-01-13 PROCEDURE — 99214 OFFICE O/P EST MOD 30 MIN: CPT | Mod: S$PBB,,, | Performed by: PHYSICIAN ASSISTANT

## 2021-01-20 ENCOUNTER — HOSPITAL ENCOUNTER (OUTPATIENT)
Dept: RADIOLOGY | Facility: HOSPITAL | Age: 56
Discharge: HOME | End: 2021-01-20
Attending: PHYSICIAN ASSISTANT
Payer: MEDICAID

## 2021-01-20 DIAGNOSIS — Z01.818 ENCOUNTER FOR OTHER PREPROCEDURAL EXAMINATION: ICD-10-CM

## 2021-01-20 DIAGNOSIS — M50.30 DEGENERATIVE DISC DISEASE, CERVICAL: ICD-10-CM

## 2021-01-20 PROCEDURE — 72141 MRI CERVICAL SPINE WITHOUT CONTRAST: ICD-10-PCS | Mod: 26,,, | Performed by: RADIOLOGY

## 2021-01-20 PROCEDURE — 72141 MRI NECK SPINE W/O DYE: CPT | Mod: 26,,, | Performed by: RADIOLOGY

## 2021-01-20 PROCEDURE — 72141 MRI NECK SPINE W/O DYE: CPT | Mod: TC

## 2021-01-26 DIAGNOSIS — M79.601 BILATERAL ARM PAIN: Primary | ICD-10-CM

## 2021-01-26 DIAGNOSIS — M79.602 BILATERAL ARM PAIN: Primary | ICD-10-CM

## 2021-02-01 DIAGNOSIS — M79.601 BILATERAL ARM PAIN: Primary | ICD-10-CM

## 2021-02-01 DIAGNOSIS — M79.602 BILATERAL ARM PAIN: Primary | ICD-10-CM

## 2021-02-17 ENCOUNTER — OFFICE VISIT (OUTPATIENT)
Dept: NEUROLOGY | Facility: CLINIC | Age: 56
End: 2021-02-17
Payer: MEDICAID

## 2021-02-17 ENCOUNTER — PROCEDURE VISIT (OUTPATIENT)
Dept: NEUROLOGY | Facility: CLINIC | Age: 56
End: 2021-02-17
Payer: MEDICAID

## 2021-02-17 VITALS
HEART RATE: 85 BPM | DIASTOLIC BLOOD PRESSURE: 93 MMHG | WEIGHT: 220.25 LBS | DIASTOLIC BLOOD PRESSURE: 93 MMHG | SYSTOLIC BLOOD PRESSURE: 156 MMHG | HEIGHT: 68 IN | BODY MASS INDEX: 33.38 KG/M2 | HEIGHT: 68 IN | WEIGHT: 220.25 LBS | HEART RATE: 85 BPM | SYSTOLIC BLOOD PRESSURE: 156 MMHG | BODY MASS INDEX: 33.38 KG/M2

## 2021-02-17 DIAGNOSIS — M79.602 BILATERAL ARM PAIN: ICD-10-CM

## 2021-02-17 DIAGNOSIS — M79.601 BILATERAL ARM PAIN: ICD-10-CM

## 2021-02-17 DIAGNOSIS — R20.0 NUMBNESS OF UPPER LIMB: ICD-10-CM

## 2021-02-17 DIAGNOSIS — M79.601 PAIN IN BOTH UPPER EXTREMITIES: Primary | ICD-10-CM

## 2021-02-17 DIAGNOSIS — M54.2 CHRONIC NECK PAIN: ICD-10-CM

## 2021-02-17 DIAGNOSIS — M54.2 NECK PAIN: ICD-10-CM

## 2021-02-17 DIAGNOSIS — G89.29 CHRONIC NECK PAIN: ICD-10-CM

## 2021-02-17 DIAGNOSIS — M79.602 PAIN IN BOTH UPPER EXTREMITIES: Primary | ICD-10-CM

## 2021-02-17 PROCEDURE — 95886 MUSC TEST DONE W/N TEST COMP: CPT | Mod: PBBFAC,PN,50 | Performed by: PSYCHIATRY & NEUROLOGY

## 2021-02-17 PROCEDURE — 99999 PR PBB SHADOW E&M-EST. PATIENT-LVL II: ICD-10-PCS | Mod: PBBFAC,,, | Performed by: PSYCHIATRY & NEUROLOGY

## 2021-02-17 PROCEDURE — 99212 OFFICE O/P EST SF 10 MIN: CPT | Mod: PBBFAC,PN,25 | Performed by: PSYCHIATRY & NEUROLOGY

## 2021-02-17 PROCEDURE — 99999 PR PBB SHADOW E&M-EST. PATIENT-LVL II: CPT | Mod: PBBFAC,,, | Performed by: PSYCHIATRY & NEUROLOGY

## 2021-02-17 PROCEDURE — 95913 NRV CNDJ TEST 13/> STUDIES: CPT | Mod: 26,S$PBB,, | Performed by: PSYCHIATRY & NEUROLOGY

## 2021-02-17 PROCEDURE — 99213 PR OFFICE/OUTPT VISIT, EST, LEVL III, 20-29 MIN: ICD-10-PCS | Mod: 25,S$PBB,, | Performed by: PSYCHIATRY & NEUROLOGY

## 2021-02-17 PROCEDURE — 95913 NRV CNDJ TEST 13/> STUDIES: CPT | Mod: PBBFAC,PN | Performed by: PSYCHIATRY & NEUROLOGY

## 2021-02-17 PROCEDURE — 99213 OFFICE O/P EST LOW 20 MIN: CPT | Mod: 25,S$PBB,, | Performed by: PSYCHIATRY & NEUROLOGY

## 2021-02-17 PROCEDURE — 95886 MUSC TEST DONE W/N TEST COMP: CPT | Mod: 26,S$PBB,, | Performed by: PSYCHIATRY & NEUROLOGY

## 2021-02-17 PROCEDURE — 95913 PR NERVE CONDUCTION STUDY; 13 OR MORE STUDIES: ICD-10-PCS | Mod: 26,S$PBB,, | Performed by: PSYCHIATRY & NEUROLOGY

## 2021-02-17 PROCEDURE — 95886 PR EMG COMPLETE, W/ NERVE CONDUCTION STUDIES, 5+ MUSCLES: ICD-10-PCS | Mod: 26,S$PBB,, | Performed by: PSYCHIATRY & NEUROLOGY

## 2021-02-19 ENCOUNTER — OFFICE VISIT (OUTPATIENT)
Dept: ORTHOPEDICS | Facility: CLINIC | Age: 56
End: 2021-02-19
Payer: MEDICAID

## 2021-02-19 VITALS
HEART RATE: 76 BPM | DIASTOLIC BLOOD PRESSURE: 96 MMHG | BODY MASS INDEX: 33.4 KG/M2 | HEIGHT: 68 IN | TEMPERATURE: 98 F | RESPIRATION RATE: 16 BRPM | WEIGHT: 220.38 LBS | SYSTOLIC BLOOD PRESSURE: 146 MMHG

## 2021-02-19 DIAGNOSIS — M25.511 CHRONIC RIGHT SHOULDER PAIN: Primary | ICD-10-CM

## 2021-02-19 DIAGNOSIS — G89.29 CHRONIC RIGHT SHOULDER PAIN: Primary | ICD-10-CM

## 2021-02-19 PROCEDURE — 99215 OFFICE O/P EST HI 40 MIN: CPT | Mod: S$PBB,,, | Performed by: PHYSICIAN ASSISTANT

## 2021-02-19 PROCEDURE — 99215 PR OFFICE/OUTPT VISIT, EST, LEVL V, 40-54 MIN: ICD-10-PCS | Mod: S$PBB,,, | Performed by: PHYSICIAN ASSISTANT

## 2021-02-19 PROCEDURE — 99999 PR PBB SHADOW E&M-EST. PATIENT-LVL IV: CPT | Mod: PBBFAC,,, | Performed by: PHYSICIAN ASSISTANT

## 2021-02-19 PROCEDURE — 99214 OFFICE O/P EST MOD 30 MIN: CPT | Mod: PBBFAC | Performed by: PHYSICIAN ASSISTANT

## 2021-02-19 PROCEDURE — 99999 PR PBB SHADOW E&M-EST. PATIENT-LVL IV: ICD-10-PCS | Mod: PBBFAC,,, | Performed by: PHYSICIAN ASSISTANT

## 2021-02-19 RX ORDER — DICLOFENAC SODIUM 75 MG/1
75 TABLET, DELAYED RELEASE ORAL 2 TIMES DAILY
Qty: 60 TABLET | Refills: 0 | Status: SHIPPED | OUTPATIENT
Start: 2021-02-19 | End: 2023-06-26

## 2021-02-26 ENCOUNTER — HOSPITAL ENCOUNTER (OUTPATIENT)
Dept: RADIOLOGY | Facility: HOSPITAL | Age: 56
Discharge: HOME | End: 2021-02-26
Attending: PHYSICIAN ASSISTANT
Payer: MEDICAID

## 2021-02-26 DIAGNOSIS — M25.511 CHRONIC RIGHT SHOULDER PAIN: ICD-10-CM

## 2021-02-26 DIAGNOSIS — G89.29 CHRONIC RIGHT SHOULDER PAIN: ICD-10-CM

## 2021-02-26 PROCEDURE — 73221 MRI JOINT UPR EXTREM W/O DYE: CPT | Mod: 26,RT,, | Performed by: RADIOLOGY

## 2021-02-26 PROCEDURE — 73221 MRI SHOULDER WITHOUT CONTRAST RIGHT: ICD-10-PCS | Mod: 26,RT,, | Performed by: RADIOLOGY

## 2021-02-26 PROCEDURE — 73221 MRI JOINT UPR EXTREM W/O DYE: CPT | Mod: TC,RT

## 2021-05-06 ENCOUNTER — PATIENT MESSAGE (OUTPATIENT)
Dept: RESEARCH | Facility: HOSPITAL | Age: 56
End: 2021-05-06